# Patient Record
Sex: MALE | Race: WHITE | Employment: FULL TIME | URBAN - METROPOLITAN AREA
[De-identification: names, ages, dates, MRNs, and addresses within clinical notes are randomized per-mention and may not be internally consistent; named-entity substitution may affect disease eponyms.]

---

## 2017-07-30 ENCOUNTER — HOSPITAL ENCOUNTER (OUTPATIENT)
Age: 52
Discharge: HOME | End: 2017-07-30

## 2017-07-30 ENCOUNTER — APPOINTMENT (OUTPATIENT)
Dept: GENERAL RADIOLOGY | Age: 52
End: 2017-07-30
Attending: PHYSICIAN ASSISTANT

## 2017-07-30 VITALS
DIASTOLIC BLOOD PRESSURE: 63 MMHG | BODY MASS INDEX: 24.38 KG/M2 | HEIGHT: 74 IN | TEMPERATURE: 99 F | OXYGEN SATURATION: 98 % | RESPIRATION RATE: 20 BRPM | HEART RATE: 80 BPM | WEIGHT: 190 LBS | SYSTOLIC BLOOD PRESSURE: 119 MMHG

## 2017-07-30 DIAGNOSIS — J20.9 ACUTE BRONCHITIS, UNSPECIFIED ORGANISM: Primary | ICD-10-CM

## 2017-07-30 PROCEDURE — 99214 OFFICE O/P EST MOD 30 MIN: CPT

## 2017-07-30 PROCEDURE — 99213 OFFICE O/P EST LOW 20 MIN: CPT

## 2017-07-30 PROCEDURE — 71020 XR CHEST PA + LAT CHEST (CPT=71020): CPT | Performed by: PHYSICIAN ASSISTANT

## 2017-07-30 RX ORDER — AZITHROMYCIN 250 MG/1
TABLET, FILM COATED ORAL
Qty: 1 PACKAGE | Refills: 0 | Status: SHIPPED | OUTPATIENT
Start: 2017-07-30 | End: 2017-08-04

## 2017-07-30 RX ORDER — ALBUTEROL SULFATE 90 UG/1
2 AEROSOL, METERED RESPIRATORY (INHALATION) EVERY 4 HOURS PRN
Qty: 1 INHALER | Refills: 0 | Status: SHIPPED | OUTPATIENT
Start: 2017-07-30 | End: 2017-08-29

## 2017-07-30 RX ORDER — METHYLPREDNISOLONE 4 MG/1
TABLET ORAL
Qty: 1 PACKAGE | Refills: 0 | Status: SHIPPED | OUTPATIENT
Start: 2017-07-30 | End: 2019-07-10

## 2017-07-30 NOTE — ED PROVIDER NOTES
Patient Seen in: [facility]    History   Patient presents with:  Cough/URI    Stated Complaint: COUGH     HPI    Juan C Bateman is a 20-year-old male who comes in today complaining of a cough for approximately 4 days that is worsening he Exam      General Appearance:  Alert and orientated x 4, cooperative, no distress, appropriate for age  Head:  Normocephalic, atraumatic, without obvious abnormality  Eyes:  PERRL, EOM's intact, conjunctiva and cornea clear, normal fundoscopic exam   Ears: ============================================================  ED Course  ------------------------------------------------------------  MDM     Patient's x-ray is negative he is an everyday smoker of approximately 1 pack per day.   Given the patient's sy may arise to return to the immediate care or ER for new, worsening or any persistent conditions. I've explained the importance of following up with his doctor- No primary care provider on file. - as instructed.   The patient verbalized understanding of th

## 2017-07-30 NOTE — ED INITIAL ASSESSMENT (HPI)
Pt c/o cold symptoms with cough x 4 days. Cough becoming progressively worse. C/o chest congestion. Productive of clear sputum. States cough keeps him up at night. Denies SOB. \"Chest sore\" with cough. No fever or chills.

## 2024-10-01 ENCOUNTER — APPOINTMENT (OUTPATIENT)
Dept: GENERAL RADIOLOGY | Age: 59
End: 2024-10-01
Attending: EMERGENCY MEDICINE
Payer: COMMERCIAL

## 2024-10-01 ENCOUNTER — HOSPITAL ENCOUNTER (OUTPATIENT)
Age: 59
Discharge: HOME | End: 2024-10-01
Attending: EMERGENCY MEDICINE
Payer: COMMERCIAL

## 2024-10-01 VITALS
HEIGHT: 74 IN | BODY MASS INDEX: 26.31 KG/M2 | RESPIRATION RATE: 20 BRPM | OXYGEN SATURATION: 92 % | HEART RATE: 118 BPM | TEMPERATURE: 98 F | WEIGHT: 205 LBS | SYSTOLIC BLOOD PRESSURE: 103 MMHG | DIASTOLIC BLOOD PRESSURE: 59 MMHG

## 2024-10-01 DIAGNOSIS — J44.1 COPD EXACERBATION (HCC): Primary | ICD-10-CM

## 2024-10-01 PROCEDURE — 94640 AIRWAY INHALATION TREATMENT: CPT

## 2024-10-01 PROCEDURE — 99204 OFFICE O/P NEW MOD 45 MIN: CPT

## 2024-10-01 PROCEDURE — 71046 X-RAY EXAM CHEST 2 VIEWS: CPT | Performed by: EMERGENCY MEDICINE

## 2024-10-01 RX ORDER — IPRATROPIUM BROMIDE AND ALBUTEROL SULFATE 2.5; .5 MG/3ML; MG/3ML
3 SOLUTION RESPIRATORY (INHALATION) ONCE
Status: COMPLETED | OUTPATIENT
Start: 2024-10-01 | End: 2024-10-01

## 2024-10-01 RX ORDER — ALBUTEROL SULFATE 0.83 MG/ML
5 SOLUTION RESPIRATORY (INHALATION) ONCE
Status: COMPLETED | OUTPATIENT
Start: 2024-10-01 | End: 2024-10-01

## 2024-10-01 RX ORDER — ALBUTEROL SULFATE 90 UG/1
2 INHALANT RESPIRATORY (INHALATION) EVERY 4 HOURS PRN
Qty: 1 EACH | Refills: 0 | Status: SHIPPED | OUTPATIENT
Start: 2024-10-01 | End: 2024-10-31

## 2024-10-01 RX ORDER — ALBUTEROL SULFATE 0.83 MG/ML
2.5 SOLUTION RESPIRATORY (INHALATION) ONCE
Status: COMPLETED | OUTPATIENT
Start: 2024-10-01 | End: 2024-10-01

## 2024-10-01 RX ORDER — PREDNISONE 20 MG/1
40 TABLET ORAL DAILY
Qty: 10 TABLET | Refills: 0 | Status: SHIPPED | OUTPATIENT
Start: 2024-10-01 | End: 2024-10-06

## 2024-10-01 RX ORDER — PREDNISONE 20 MG/1
40 TABLET ORAL ONCE
Status: COMPLETED | OUTPATIENT
Start: 2024-10-01 | End: 2024-10-01

## 2024-10-01 NOTE — ED PROVIDER NOTES
Patient Seen in: [facility]      History     Chief Complaint   Patient presents with    Cough/URI     Stated Complaint: cough    Subjective:   HPI    58-year-old male with a previous history of a 80-pack-year smoking history presents to the immediate care with complaints of cough.  He says for the last 3 to 4 days he has had a persistent cough.  He has had minimal sputum production.  He denies any chest pain.  Denies any nausea vomit diaphoresis.  Denies any fevers or chills.  Patient states that he has a history of bronchitis.    Objective:     No pertinent past medical history.            No pertinent past surgical history.              No pertinent social history.            Physical Exam     ED Triage Vitals [10/01/24 1838]   /59   Pulse 95   Resp 18   Temp 98.4 °F (36.9 °C)   Temp src Oral   SpO2 90 %   O2 Device None (Room air)       Current Vitals:   Vital Signs  BP: 103/59  Pulse: 95  Resp: 18  Temp: 98.4 °F (36.9 °C)  Temp src: Oral    Oxygen Therapy  SpO2: 90 %  O2 Device: None (Room air) (sat decreased to 88 % after nebulizer completed. DEnies SOB)        Physical Exam  General: Alert and oriented. No acute distress.  HEENT: Normocephalic. No evidence of trauma. Extraocular movements are intact.  Cardiovascular exam: Regular rate and rhythm  Lungs: Very diminished breath sounds bilaterally.  There is poor air movement.  No audible wheezes rales or rhonchi are noted   abdomen: Soft, nondistended, nontender.  Extremities: No evidence of deformity. No clubbing or cyanosis.  Neuro: No focal deficit is noted.    ED Course   Labs Reviewed - No data to display  Patient noted to be hypoxic with a saturation of 88 to 90%.  Patient was administered an albuterol and Atrovent nebulizer treatment.  He was given prednisone 40 mg.  Patient was reassessed after his initial breathing treatment.  His room air pulse ox improved to 92%.  He is given additional 2 albuterol nebulizer treatments.  PA and  lateral chest x-ray is negative for evidence of consolidation infiltrate pneumothorax or effusion on my independent review the images.    Patient will be discharged home with an albuterol inhaler and prednisone for course of 5 days.  Recommend follow-up with his primary care doctor.     MDM   Patient was screened and evaluated during this visit.   As a treating physician attending to the patient, I determined, within reasonable clinical confidence and prior to discharge, that an emergency medical condition was not or was no longer present.  There was no indication for further evaluation, treatment or admission on an emergency basis.  Comprehensive verbal and written discharge and follow-up instructions were provided to help prevent relapse or worsening.  Patient was instructed to follow-up with her primary care provider for further evaluation and treatment, but to return immediately to the ER for worsening, concerning, new, changing or persisting symptoms.  I discussed the case with the patient and they had no questions, complaints, or concerns.  Patient felt comfortable going home.    ^^Please note that this report has been produced using speech recognition software and may contain errors related to that system including, but not limited to, errors in grammar, punctuation, and spelling, as well as words and phrases that possibly may have been recognized inappropriately.  If there are any questions or concerns, contact the dictating provider for clarification      Medical Decision Making      Disposition and Plan     Clinical Impression:  1. COPD exacerbation (HCC)         Disposition:  Discharge  10/1/2024  7:43 pm    Follow-up:  No follow-up provider specified.        Medications Prescribed:  Current Discharge Medication List        START taking these medications    Details   albuterol 108 (90 Base) MCG/ACT Inhalation Aero Soln Inhale 2 puffs into the lungs every 4 (four) hours as needed for Wheezing.  Qty: 1  each, Refills: 0      predniSONE 20 MG Oral Tab Take 2 tablets (40 mg total) by mouth daily for 5 days.  Qty: 10 tablet, Refills: 0                 Supplementary Documentation:

## 2024-10-02 NOTE — DISCHARGE INSTRUCTIONS
Follow-up with your primary care doctor  Use albuterol inhaler 2 puffs every 4 hours  Take prednisone 40 mg a day for 5 days  Return if any worsening symptoms or new concern